# Patient Record
Sex: FEMALE | Race: WHITE | Employment: UNEMPLOYED | URBAN - METROPOLITAN AREA
[De-identification: names, ages, dates, MRNs, and addresses within clinical notes are randomized per-mention and may not be internally consistent; named-entity substitution may affect disease eponyms.]

---

## 2021-10-15 ENCOUNTER — HOSPITAL ENCOUNTER (EMERGENCY)
Age: 36
Discharge: HOME | End: 2021-10-16
Attending: EMERGENCY MEDICINE

## 2021-10-15 ENCOUNTER — APPOINTMENT (OUTPATIENT)
Dept: CT IMAGING | Age: 36
End: 2021-10-15

## 2021-10-15 DIAGNOSIS — R56.9 SEIZURE-LIKE ACTIVITY (HCC): ICD-10-CM

## 2021-10-15 DIAGNOSIS — R55 SYNCOPE AND COLLAPSE: Primary | ICD-10-CM

## 2021-10-15 LAB — HCG QUALITATIVE: NEGATIVE

## 2021-10-15 PROCEDURE — 70450 CT HEAD/BRAIN W/O DYE: CPT

## 2021-10-15 PROCEDURE — 85025 COMPLETE CBC W/AUTO DIFF WBC: CPT

## 2021-10-15 PROCEDURE — 84703 CHORIONIC GONADOTROPIN ASSAY: CPT

## 2021-10-15 PROCEDURE — 80307 DRUG TEST PRSMV CHEM ANLYZR: CPT

## 2021-10-15 PROCEDURE — 80143 DRUG ASSAY ACETAMINOPHEN: CPT

## 2021-10-15 PROCEDURE — 80048 BASIC METABOLIC PNL TOTAL CA: CPT

## 2021-10-15 PROCEDURE — 99285 EMERGENCY DEPT VISIT HI MDM: CPT

## 2021-10-15 PROCEDURE — G0480 DRUG TEST DEF 1-7 CLASSES: HCPCS

## 2021-10-15 PROCEDURE — 72128 CT CHEST SPINE W/O DYE: CPT

## 2021-10-15 PROCEDURE — 72125 CT NECK SPINE W/O DYE: CPT

## 2021-10-15 PROCEDURE — 36415 COLL VENOUS BLD VENIPUNCTURE: CPT

## 2021-10-15 PROCEDURE — 80179 DRUG ASSAY SALICYLATE: CPT

## 2021-10-15 PROCEDURE — 93005 ELECTROCARDIOGRAM TRACING: CPT | Performed by: EMERGENCY MEDICINE

## 2021-10-15 PROCEDURE — 84484 ASSAY OF TROPONIN QUANT: CPT

## 2021-10-15 ASSESSMENT — PAIN DESCRIPTION - ORIENTATION: ORIENTATION: LEFT

## 2021-10-15 ASSESSMENT — PAIN DESCRIPTION - DESCRIPTORS: DESCRIPTORS: ACHING

## 2021-10-15 ASSESSMENT — PAIN DESCRIPTION - PAIN TYPE: TYPE: ACUTE PAIN

## 2021-10-15 ASSESSMENT — PAIN DESCRIPTION - LOCATION: LOCATION: BACK;HIP;ELBOW

## 2021-10-15 ASSESSMENT — PAIN SCALES - GENERAL: PAINLEVEL_OUTOF10: 8

## 2021-10-16 ENCOUNTER — APPOINTMENT (OUTPATIENT)
Dept: CT IMAGING | Age: 36
End: 2021-10-16

## 2021-10-16 VITALS
SYSTOLIC BLOOD PRESSURE: 93 MMHG | WEIGHT: 170 LBS | DIASTOLIC BLOOD PRESSURE: 57 MMHG | BODY MASS INDEX: 31.28 KG/M2 | OXYGEN SATURATION: 99 % | RESPIRATION RATE: 11 BRPM | TEMPERATURE: 98.7 F | HEIGHT: 62 IN | HEART RATE: 66 BPM

## 2021-10-16 LAB
ABSOLUTE EOS #: 0.1 K/UL (ref 0–0.4)
ABSOLUTE IMMATURE GRANULOCYTE: ABNORMAL K/UL (ref 0–0.3)
ABSOLUTE LYMPH #: 1.5 K/UL (ref 1–4.8)
ABSOLUTE MONO #: 0.4 K/UL (ref 0.1–1.3)
ACETAMINOPHEN LEVEL: <5 UG/ML (ref 10–30)
ANION GAP SERPL CALCULATED.3IONS-SCNC: 9 MMOL/L (ref 9–17)
BASOPHILS # BLD: 0 % (ref 0–2)
BASOPHILS ABSOLUTE: 0 K/UL (ref 0–0.2)
BUN BLDV-MCNC: 8 MG/DL (ref 6–20)
BUN/CREAT BLD: ABNORMAL (ref 9–20)
CALCIUM SERPL-MCNC: 9.1 MG/DL (ref 8.6–10.4)
CHLORIDE BLD-SCNC: 102 MMOL/L (ref 98–107)
CO2: 25 MMOL/L (ref 20–31)
CREAT SERPL-MCNC: 0.65 MG/DL (ref 0.5–0.9)
DIFFERENTIAL TYPE: ABNORMAL
EKG ATRIAL RATE: 57 BPM
EKG P AXIS: 50 DEGREES
EKG P-R INTERVAL: 146 MS
EKG Q-T INTERVAL: 432 MS
EKG QRS DURATION: 84 MS
EKG QTC CALCULATION (BAZETT): 420 MS
EKG R AXIS: 61 DEGREES
EKG T AXIS: 46 DEGREES
EKG VENTRICULAR RATE: 57 BPM
EOSINOPHILS RELATIVE PERCENT: 2 % (ref 0–4)
ETHANOL PERCENT: <0.01 %
ETHANOL: <10 MG/DL
GFR AFRICAN AMERICAN: >60 ML/MIN
GFR NON-AFRICAN AMERICAN: >60 ML/MIN
GFR SERPL CREATININE-BSD FRML MDRD: ABNORMAL ML/MIN/{1.73_M2}
GFR SERPL CREATININE-BSD FRML MDRD: ABNORMAL ML/MIN/{1.73_M2}
GLUCOSE BLD-MCNC: 125 MG/DL (ref 70–99)
HCT VFR BLD CALC: 36.4 % (ref 36–46)
HEMOGLOBIN: 11.8 G/DL (ref 12–16)
IMMATURE GRANULOCYTES: ABNORMAL %
LYMPHOCYTES # BLD: 19 % (ref 24–44)
MCH RBC QN AUTO: 28.1 PG (ref 26–34)
MCHC RBC AUTO-ENTMCNC: 32.5 G/DL (ref 31–37)
MCV RBC AUTO: 86.5 FL (ref 80–100)
MONOCYTES # BLD: 6 % (ref 1–7)
NRBC AUTOMATED: ABNORMAL PER 100 WBC
PDW BLD-RTO: 14 % (ref 11.5–14.9)
PLATELET # BLD: 273 K/UL (ref 150–450)
PLATELET ESTIMATE: ABNORMAL
PMV BLD AUTO: 7.3 FL (ref 6–12)
POTASSIUM SERPL-SCNC: 3.8 MMOL/L (ref 3.7–5.3)
RBC # BLD: 4.21 M/UL (ref 4–5.2)
RBC # BLD: ABNORMAL 10*6/UL
SALICYLATE LEVEL: <1 MG/DL (ref 3–10)
SEG NEUTROPHILS: 73 % (ref 36–66)
SEGMENTED NEUTROPHILS ABSOLUTE COUNT: 5.6 K/UL (ref 1.3–9.1)
SODIUM BLD-SCNC: 136 MMOL/L (ref 135–144)
TOXIC TRICYCLIC SC,BLOOD: ABNORMAL
TROPONIN INTERP: NORMAL
TROPONIN INTERP: NORMAL
TROPONIN T: NORMAL NG/ML
TROPONIN T: NORMAL NG/ML
TROPONIN, HIGH SENSITIVITY: <6 NG/L (ref 0–14)
TROPONIN, HIGH SENSITIVITY: <6 NG/L (ref 0–14)
WBC # BLD: 7.7 K/UL (ref 3.5–11)
WBC # BLD: ABNORMAL 10*3/UL

## 2021-10-16 PROCEDURE — 6370000000 HC RX 637 (ALT 250 FOR IP): Performed by: EMERGENCY MEDICINE

## 2021-10-16 PROCEDURE — 93010 ELECTROCARDIOGRAM REPORT: CPT | Performed by: INTERNAL MEDICINE

## 2021-10-16 PROCEDURE — 36415 COLL VENOUS BLD VENIPUNCTURE: CPT

## 2021-10-16 PROCEDURE — 72131 CT LUMBAR SPINE W/O DYE: CPT

## 2021-10-16 PROCEDURE — 84484 ASSAY OF TROPONIN QUANT: CPT

## 2021-10-16 RX ORDER — HYDROCODONE BITARTRATE AND ACETAMINOPHEN 5; 325 MG/1; MG/1
1 TABLET ORAL ONCE
Status: COMPLETED | OUTPATIENT
Start: 2021-10-16 | End: 2021-10-16

## 2021-10-16 RX ADMIN — HYDROCODONE BITARTRATE AND ACETAMINOPHEN 1 TABLET: 5; 325 TABLET ORAL at 02:47

## 2021-10-16 ASSESSMENT — ENCOUNTER SYMPTOMS
TROUBLE SWALLOWING: 0
DIARRHEA: 0
SORE THROAT: 0
CONSTIPATION: 0
NAUSEA: 0
BLOOD IN STOOL: 0
VOMITING: 0
BACK PAIN: 0
COLOR CHANGE: 0
ABDOMINAL PAIN: 0
COUGH: 0
SHORTNESS OF BREATH: 0

## 2021-10-16 ASSESSMENT — PAIN SCALES - GENERAL: PAINLEVEL_OUTOF10: 6

## 2021-10-16 NOTE — ED TRIAGE NOTES
Pt presents to ED via EMS with c/o syncope and fall. Per EMS report bystanders said that they saw seizure like activity, pt has not hx of seizures and EMS reports no seizure like activity. Pt reports falling down wooden stairs and landing on back, she states that she got hot and sweaty after falling and loss consciousness. She has a hx of syncopal episodes and hypotension, pt is A&O x 4 with c collar in place.

## 2021-10-16 NOTE — ED PROVIDER NOTES
[address] [facility]  EMERGENCY DEPARTMENT ENCOUNTER    Pt Name: Rajesh Shell  MRN: [id number]  YOB: 1985  Date of evaluation:10/15/21  PCP: No primary care provider on file. CHIEF COMPLAINT       Chief Complaint   Patient presents with    Fall    Loss of Consciousness       HISTORY OF PRESENT ILLNESS    Rajesh Shell is a 37 y.o. female who presents with a chief complaint of a fall. Patient states she was walking on the stairs and she fell down approximately 6-8 stairs. She is unclear how she actually fell if she tripped or if she passed out lost consciousness first.  She is complained of a headache, neck pain and back pain. Denies any pain anywhere else. She had some sort of seizure activity at the bottom of the stairs but she is unclear how long this lasted. This was witnessed by her mother. Does not have a history of seizures. No chest pain or difficulty breathing. No drugs or alcohol today. Symptoms are acute. Symptoms are moderate per nothing make symptoms better or worse. Patient has no other complaints at this time. REVIEW OF SYSTEMS       Review of Systems   Constitutional: Negative for chills, fatigue and fever. HENT: Negative for congestion, ear pain, sore throat and trouble swallowing. Eyes: Negative for visual disturbance. Respiratory: Negative for cough and shortness of breath. Cardiovascular: Negative for chest pain, palpitations and leg swelling. Gastrointestinal: Negative for abdominal pain, blood in stool, constipation, diarrhea, nausea and vomiting. Genitourinary: Negative for dysuria and flank pain. Musculoskeletal: Positive for neck pain. Negative for arthralgias, back pain and myalgias. Skin: Negative for color change, rash and wound. Neurological: Positive for seizures and headaches. Negative for dizziness, weakness, light-headedness and numbness. Psychiatric/Behavioral: Negative for confusion.    All other systems reviewed and are negative. Negative in 10 essential Systems except as mentioned above and in the HPI. PAST MEDICAL HISTORY     Past Medical History:   Diagnosis Date    Hypotension     POTS (postural orthostatic tachycardia syndrome)          SURGICAL HISTORY      has a past surgical history that includes Appendectomy. CURRENT MEDICATIONS       Previous Medications    No medications on file       ALLERGIES     has No Known Allergies. FAMILY HISTORY     has no family status information on file. family history is not on file. SOCIAL HISTORY      reports that she has been smoking e-cigarettes. She has never used smokeless tobacco. She reports current alcohol use. She reports that she does not use drugs. PHYSICAL EXAM     INITIAL VITALS:  height is 5' 2\" (1.575 m) and weight is 170 lb (77.1 kg). Her oral temperature is 98.7 °F (37.1 °C). Her blood pressure is 93/57 (abnormal) and her pulse is 66. Her respiration is 11 and oxygen saturation is 99%. Physical Exam  Vitals and nursing note reviewed. Constitutional:       General: She is not in acute distress. HENT:      Head: Normocephalic and atraumatic. Eyes:      Conjunctiva/sclera: Conjunctivae normal.      Pupils: Pupils are equal, round, and reactive to light. Cardiovascular:      Rate and Rhythm: Normal rate and regular rhythm. Heart sounds: Normal heart sounds. Pulmonary:      Effort: Pulmonary effort is normal. No respiratory distress. Breath sounds: Normal breath sounds. Abdominal:      General: Bowel sounds are normal. There is no distension. Palpations: Abdomen is soft. Tenderness: There is no abdominal tenderness. Musculoskeletal:         General: Tenderness present. Cervical back: Neck supple. Tenderness present. Skin:     General: Skin is warm and dry. Findings: No rash. Neurological:      Mental Status: She is alert and oriented to person, place, and time.    Psychiatric:         Judgment: Judgment normal.           DIFFERENTIAL DIAGNOSIS/MDM:   29-year-old female presents with a fall down 6-8 stairs with questionable seizure-like activity while at the bottom of the stairs. She is afebrile, nontoxic, normal vital signs. No acute distress. She is alert and orient x4 with a GCS 15. She is in a cervical collar per EMS. Does have some very mild tenderness in her cervical spine however seems to be mostly paracervical.    We will get CT scan head, cervical spine, will get blood work. DIAGNOSTIC RESULTS     EKG: All EKG's are interpreted by the Emergency Department Physician who either signs or Co-signs this chart in the absence of a cardiologist.    EKG Interpretation    Interpreted by me    Rhythm: Sinus bradycardia  Rate: normal  Axis: normal  Ectopy: none  Conduction: normal  ST Segments: no acute change  T Waves: no acute change  Q Waves: none    Clinical Impression: Nonspecific EKG, sinus bradycardia    RADIOLOGY:   I directly visualized the following  images and reviewed the radiologist interpretations:  CT LUMBAR SPINE WO CONTRAST   Final Result   1. No evidence of acute intracranial trauma. 2. No acute abnormality of the cervical, thoracic or lumbar spine. CT CERVICAL SPINE WO CONTRAST   Final Result   1. No evidence of acute intracranial trauma. 2. No acute abnormality of the cervical, thoracic or lumbar spine. CT THORACIC SPINE WO CONTRAST   Final Result   1. No evidence of acute intracranial trauma. 2. No acute abnormality of the cervical, thoracic or lumbar spine. CT HEAD WO CONTRAST   Final Result   1. No evidence of acute intracranial trauma. 2. No acute abnormality of the cervical, thoracic or lumbar spine.                  ED BEDSIDE ULTRASOUND:      LABS:  Labs Reviewed   CBC WITH AUTO DIFFERENTIAL - Abnormal; Notable for the following components:       Result Value    Hemoglobin 11.8 (*)     Seg Neutrophils 73 (*)     Lymphocytes 19 (*)     All other components within normal limits   BASIC METABOLIC PANEL - Abnormal; Notable for the following components:    Glucose 125 (*)     All other components within normal limits   TOX SCR, BLD, ED - Abnormal; Notable for the following components:    Acetaminophen Level <5 (*)     Salicylate Lvl <1 (*)     All other components within normal limits   TROPONIN   TROPONIN   HCG, SERUM, QUALITATIVE   DRUG SCREEN TRICYCLIC URINE         EMERGENCY DEPARTMENT COURSE:   Vitals:    Vitals:    10/16/21 0015 10/16/21 0145 10/16/21 0200 10/16/21 0215   BP: (!) 88/45 (!) 79/70 (!) 92/51 (!) 93/57   Pulse: 60 82 60 66   Resp: 16 11 10 11   Temp:       TempSrc:       SpO2: 97% 97% 97% 99%   Weight:       Height:         Work-up is unremarkable here. CT scans are normal.  Patient was removed from cervical collar. She was able to get up and ambulate. Her blood pressure is improved. Her blood work unremarkable. She has not had any further seizure activity here. It is possible that she had seizure-like activity however the seizure activity that the mother witnessed could also have been secondary to her vasovagal syncope. She was never postictal here. Advise close follow-up with PCP, return if any symptoms worsen. Patient agreed with plan will be discharged home today. CRITICALCARE:      CONSULTS:  None      PROCEDURES:      FINAL IMPRESSION      1. Syncope and collapse    2.  Seizure-like activity [facility])            DISPOSITION/PLAN   DISPOSITION Decision To Discharge 10/16/2021 02:28:29 AM          PATIENT REFERRED TO:  [facility]  Aaron Swain [address] [place] [address]  [phone]    As needed, If symptoms worsen      DISCHARGE MEDICATIONS:  New Prescriptions    No medications on file       (Please note that portions ofthis note were completed with a voice recognition program.  Efforts were made to edit the dictations but occasionally words are mis-transcribed.)    Vidya Wang DO  Attending Emergency Physician          Franco Mitchell,   10/16/21 [id number]

## 2021-10-16 NOTE — ED NOTES
Bed: 07A  Expected date:   Expected time:   Means of arrival:   Comments:  Mary Jo Welsh RN  10/15/21 [id number]